# Patient Record
Sex: MALE | Race: WHITE | NOT HISPANIC OR LATINO | Employment: UNEMPLOYED | URBAN - METROPOLITAN AREA
[De-identification: names, ages, dates, MRNs, and addresses within clinical notes are randomized per-mention and may not be internally consistent; named-entity substitution may affect disease eponyms.]

---

## 2018-10-28 ENCOUNTER — COMMUNICATION - HEALTHEAST (OUTPATIENT)
Dept: NEUROSURGERY | Facility: CLINIC | Age: 38
End: 2018-10-28

## 2018-10-29 ENCOUNTER — OFFICE VISIT - HEALTHEAST (OUTPATIENT)
Dept: NEUROSURGERY | Facility: CLINIC | Age: 38
End: 2018-10-29

## 2018-10-29 DIAGNOSIS — M54.16 LUMBAR RADICULOPATHY: ICD-10-CM

## 2021-05-24 ENCOUNTER — RECORDS - HEALTHEAST (OUTPATIENT)
Dept: ADMINISTRATIVE | Facility: CLINIC | Age: 41
End: 2021-05-24

## 2021-05-25 ENCOUNTER — RECORDS - HEALTHEAST (OUTPATIENT)
Dept: ADMINISTRATIVE | Facility: CLINIC | Age: 41
End: 2021-05-25

## 2021-06-16 PROBLEM — M54.16 LUMBAR RADICULOPATHY: Status: ACTIVE | Noted: 2018-10-27

## 2021-06-16 PROBLEM — M54.16 ACUTE LUMBAR RADICULOPATHY: Status: ACTIVE | Noted: 2018-10-28

## 2021-06-21 NOTE — PROGRESS NOTES
CHART NOTE     DATE OF SERVICE:  10/29/2018     : 1980   38 y.o.     ASSESSMENT :   Lumbar radiculopathy in L5 distribution, initially responding well to steroids then exacerbated with ambulating from ED to clinic appt.      PLAN: continue conservative management, patient does not have insurance. I will send him with narcotics, continue steroids. Will try and NINO to see if this helps with pain. Given he did initially have some improvement, hope this can continue with appropriate interventions in place. He will call us if pain gets worse, he will call us with issues.  Gave information for financial . This will allow some time for him to figure out insurance, pcp appt. If pain does not improve in 3-5 days or with NINO, will plan surgical intervention. Patient in agreement with plan, requesting to try NINO prior to surgical intervention.     HPI:  Dustin Trotter is status post hospital consult for lumbar radiculopathy r/t herniated disc. Initially seen in hospital, pain improving with steroids and pain medication at that time. He was discharged with muscle relaxants, nsaids for pain control. He slept well overnight but was awakened by the pain this AM and presented to the ED. Pain is in the right leg, thigh pain resolved, right shin and inside of right foot burning/painful. Back and hip pain bad also. Pain is worse with standing, better with laying with knees up. Better when bending over. Pain meds and muscle relaxants helpful, ibuprofen ineffective. They would not let him drive from the ED to this appt so he walked. This made the pain severe. After some time laying, patient had improvement in pain with just rest. He works in construction. Pain began three days ago, woke up with stiff/numb leg.     TODAY, patient reports severe burning type pain in the RLE, in L5 distribution. No left leg symptoms. No new changes in status, no bowel or bladder issues. Denies fevers, chills. No active s/sx of  infection.      PAST MEDICAL HISTORY, SURGICAL HISTORY, REVIEW OF SYMPTOMS, MEDICATIONS AND ALLERGIES:  Past medical history, surgical history, ROS, medications and allergies reviewed with patient and remain unchanged from previous visit, except as noted in HPI.     PMH reviewed, no contributory, is a smoker.     Past Surgical History:   Procedure Laterality Date     APPENDECTOMY       FACIAL COSMETIC SURGERY         Current Outpatient Prescriptions   Medication Sig Dispense Refill     acetaminophen (TYLENOL) 500 MG tablet Take 500 mg by mouth every 6 (six) hours as needed for pain.       cyclobenzaprine (FLEXERIL) 10 MG tablet Take 0.5 tablets (5 mg total) by mouth 3 (three) times a day as needed for muscle spasms. 6 tablet 0     dexamethasone (DECADRON) 4 MG tablet Take 1 tablet (4 mg total) by mouth every 8 (eight) hours for 3 days. 9 tablet 0     ibuprofen (ADVIL,MOTRIN) 400 MG tablet Take 400 mg by mouth every 6 (six) hours as needed for pain.       No current facility-administered medications for this visit.        PHYSICAL EXAM:    /84  Pulse 72  Resp 18    Neurological exam reveals:  Respirations easy, non-labored.   Skin: W/D/I. No rashes, lesions or breaks in integrity.   Recent and remote memory intact, fund of knowledge wnl.    Alert and oriented x3, speech fluent and appropriate.   Comprehension intact with 2 step crossover command.   Finger nose finger smooth and accurate  Counts by Serial 7s with ease, Spells WORLD forward and in reverse.   PERRL, EOMI, No nystagmus,   Face symmetric, tongue midline, Uvula midline,  palate rises with phonation   Shoulder shrug equal  Arm strength bilateral grasp, biceps, triceps, and deltoids 5/5   Leg strength bilateral dorsiflexion, plantar flexion, and hip flexion 5/5, slightly limited due to pain in RLE with ankle inversion.   Straight leg raise positive on the right  Stiff right leg with ambulation, otherwise not bad.    Reflexes: No pathological reflexes      RADIOGRAPHIC IMAGING:  Films personally reviewed and interpreted.     Reviewed imaging with patient.       CONCLUSION:  1.  Unremarkable cord and cauda equina nerve roots.  2.  At L4-L5, there is a prominent inferiorly directed right paracentral/lateral disc extrusion which effaces the right lateral recess and displaces the traversing right L5 nerve root.  3.  At L5-S1, there is moderate bilateral neural foraminal stenosis.  4.  At L2-L3 and L3-L4, there is left lateral recess stenosis with encroachment upon the descending nerve roots at these levels.  5.  Edema of the L4-5 intervertebral disc and adjacent endplates. This may be degenerative. However, clinical correlation is recommended to exclude the alternative possibility of low-grade infection.

## 2021-07-23 ENCOUNTER — HOSPITAL ENCOUNTER (EMERGENCY)
Facility: HOSPITAL | Age: 41
Discharge: HOME | End: 2021-07-23
Attending: STUDENT IN AN ORGANIZED HEALTH CARE EDUCATION/TRAINING PROGRAM | Admitting: STUDENT IN AN ORGANIZED HEALTH CARE EDUCATION/TRAINING PROGRAM

## 2021-07-23 ENCOUNTER — APPOINTMENT (OUTPATIENT)
Dept: RADIOLOGY | Facility: HOSPITAL | Age: 41
End: 2021-07-23
Attending: STUDENT IN AN ORGANIZED HEALTH CARE EDUCATION/TRAINING PROGRAM

## 2021-07-23 VITALS
OXYGEN SATURATION: 98 % | TEMPERATURE: 98.8 F | HEART RATE: 109 BPM | DIASTOLIC BLOOD PRESSURE: 85 MMHG | SYSTOLIC BLOOD PRESSURE: 131 MMHG | BODY MASS INDEX: 29.99 KG/M2 | RESPIRATION RATE: 16 BRPM | WEIGHT: 215 LBS

## 2021-07-23 DIAGNOSIS — J06.9 VIRAL UPPER RESPIRATORY TRACT INFECTION: ICD-10-CM

## 2021-07-23 LAB — SARS-COV-2 RNA RESP QL NAA+PROBE: NEGATIVE

## 2021-07-23 PROCEDURE — 99284 EMERGENCY DEPT VISIT MOD MDM: CPT | Mod: 25

## 2021-07-23 PROCEDURE — 93005 ELECTROCARDIOGRAM TRACING: CPT | Performed by: STUDENT IN AN ORGANIZED HEALTH CARE EDUCATION/TRAINING PROGRAM

## 2021-07-23 PROCEDURE — 71046 X-RAY EXAM CHEST 2 VIEWS: CPT

## 2021-07-23 PROCEDURE — C9803 HOPD COVID-19 SPEC COLLECT: HCPCS

## 2021-07-23 PROCEDURE — 87635 SARS-COV-2 COVID-19 AMP PRB: CPT | Performed by: STUDENT IN AN ORGANIZED HEALTH CARE EDUCATION/TRAINING PROGRAM

## 2021-07-23 ASSESSMENT — ENCOUNTER SYMPTOMS: COUGH: 1

## 2021-07-24 NOTE — ED PROVIDER NOTES
EMERGENCY DEPARTMENT ENCOUNTER      NAME: Dustin Trotter  AGE: 40 year old male  YOB: 1980  MRN: [id number]  EVALUATION DATE & TIME: 7/23/2021  7:47 PM    PCP: No primary care provider on file.    ED PROVIDER: Bacilio Grey M.D.      Chief Complaint   Patient presents with     Nasal Congestion     chest congestion         FINAL IMPRESSION:  1. Viral upper respiratory tract infection          ED COURSE & MEDICAL DECISION MAKING:    Pertinent Labs & Imaging studies reviewed. (See chart for details)  40 year old male presents to the Emergency Department for evaluation of nasal congestion.  Patient initially was tachycardic in triage however EKG is normal here and he is not tachycardic on my exam.  Chest x-ray shows no pneumonia.  Patient's symptoms are consistent with a simple viral URI.  The patient was concerned that he has had previous facial trauma and that he often gets antibiotics when he has nasal congestion.  Patient does not have any constitutional symptoms or fever symptoms only been present for approximately 7 days.  Did not believe patient had a bacterial sinusitis requiring antibiotics.  We will treat this as a viral URI with over-the-counter decongestions and nasal sprays and have him follow-up with his primary doctor on Monday if symptoms persist.    8:00 PM I met with the patient, obtained history, performed an initial exam, and discussed options and plan for diagnostics and treatment here in the ED. [place] PPE was worn during the entire patient encounter.    At the conclusion of the encounter I discussed the results of all of the tests and the disposition. The questions were answered. The patient or family acknowledged understanding and was agreeable with the care plan.         MEDICATIONS GIVEN IN THE EMERGENCY:  Medications - No data to display    NEW PRESCRIPTIONS STARTED AT TODAY'S ER VISIT  Discharge Medication List as of 7/23/2021  8:39 PM              =================================================================    HPI    Patient information was obtained from: Patient    Use of : N/A       Dustin Trotter is a 40 year old male otherwise healthy who presents to this ED via private car for evaluation of worsening nasal congestion over last ~7 days. He states the congestion has spread to his chest, and he endorses a cough with yellow phlegm that began ~6 days ago. Patient tried taking decongestants and cough syrup with no provided relief. Notably, he mentions that he experiences sinus infections annually. Patient is a regular smoker. Has no history of asthma. He is not COVID-19 vaccinated.     Denies chest pain, or any additional symptoms.    REVIEW OF SYSTEMS   Review of Systems   HENT: Positive for congestion. Negative for sneezing.    Respiratory: Positive for cough.    Cardiovascular: Negative for chest pain.   All other systems reviewed and are negative.      PAST MEDICAL HISTORY:  No past medical history on file.    PAST SURGICAL HISTORY:  Past Surgical History:   Procedure Laterality Date     APPENDECTOMY       FACIAL COSMETIC SURGERY             CURRENT MEDICATIONS:    acetaminophen (TYLENOL) 500 MG tablet  cyclobenzaprine (FLEXERIL) 10 MG tablet  ibuprofen (ADVIL,MOTRIN) 400 MG tablet        ALLERGIES:  No Known Allergies    FAMILY HISTORY:  No family history on file.    SOCIAL HISTORY:   Social History     Socioeconomic History     Marital status: Single     Spouse name: Not on file     Number of children: Not on file     Years of education: Not on file     Highest education level: Not on file   Occupational History     Not on file   Tobacco Use     Smoking status: Current Every Day Smoker     Packs/day: 0.50     Years: 20.00     Pack years: 10.00     Smokeless tobacco: Never Used   Substance and Sexual Activity     Alcohol use: Yes     Comment: Alcoholic Drinks/day: rarely     Drug use: Yes     Types: Marijuana     Sexual activity: Not  on file   Other Topics Concern     Not on file   Social History Narrative    Works in landscaping and [employer]     Social Determinants of Health     Financial Resource Strain:      Difficulty of Paying Living Expenses:    Food Insecurity:      Worried About Running Out of Food in the Last Year:      Ran Out of Food in the Last Year:    Transportation Needs:      Lack of Transportation (Medical):      Lack of Transportation (Non-Medical):    Physical Activity:      Days of Exercise per Week:      Minutes of Exercise per Session:    Stress:      Feeling of Stress :    Social Connections:      Frequency of Communication with Friends and Family:      Frequency of Social Gatherings with Friends and Family:      Attends [employer] Services:      Active Member of Clubs or Organizations:      Attends Club or Organization Meetings:      Marital Status:    Intimate Partner Violence:      Fear of Current or Ex-Partner:      Emotionally Abused:      Physically Abused:      Sexually Abused:        VITALS:  /85   Pulse 109   Temp 98.8  F (37.1  C) (Temporal)   Resp 16   Wt 97.5 kg (215 lb)   SpO2 98%   BMI 29.99 kg/m        PHYSICAL EXAM    Constitutional: Well developed, Well nourished, NAD, GCS 15  HENT: Normocephalic, Atraumatic, Bilateral external ears normal, Oropharynx normal, mucous membranes moist, Nose normal. Neck-  Normal range of motion, No tenderness, Supple, No stridor.  Eyes: PERRL, EOMI, Conjunctiva normal, No discharge.   Respiratory: Mild wheezing with no focality. No respiratory distress, Speaks full sentences easily.   Cardiovascular: Normal heart rate, Regular rhythm, No murmurs, No rubs, No gallops. Chest wall nontender.  GI:Soft, No tenderness, No masses, No flank tenderness. No rebound or guarding.   Musculoskeletal: 2+ DP pulses. No edema.No cyanosis, No clubbing. Good range of motion in all major joints. No tenderness to palpation or major deformities noted.   Integument: Warm, Dry, No  erythema, No rash. No petechiae.   Neurologic: Alert & oriented x 3,  CN 3-12 intact Normal motor function, Normal sensory function, No focal deficits noted. Normal gait. Normal finger to nose bilaterally  Psychiatric: Affect normal, Judgment normal, Mood normal. Cooperative.          LAB:  All pertinent labs reviewed and interpreted.  Results for orders placed or performed during the hospital encounter of 07/23/21   Chest XR,  PA & LAT    Impression    IMPRESSION: Negative chest.   SARS-COV2 (COVID-19) Virus RT-PCR    Specimen: Nasopharyngeal; Swab   Result Value Ref Range    SARS CoV2 PCR Negative Negative       RADIOLOGY:  Reviewed all pertinent imaging. Please see official radiology report.  Chest XR,  PA & LAT   Final Result   IMPRESSION: Negative chest.          I, Aron Mishra, am serving as a scribe to document services personally performed by Dr. Bacilio Grey based on my observation and the provider's statements to me. Bacilio LECHUGA MD attest that Aron Mishra is acting in a scribe capacity, has observed my performance of the services and has documented them in accordance with my direction.    Bacilio Grey M.D.  Emergency Medicine  [facility] EMERGENCY DEPARTMENT  [address] [address]  [phone]  Dept: [phone]     Bacilio Grey MD  07/23/21 2540

## 2021-07-26 LAB
ATRIAL RATE - MUSE: 82 BPM
DIASTOLIC BLOOD PRESSURE - MUSE: NORMAL MMHG
INTERPRETATION ECG - MUSE: NORMAL
P AXIS - MUSE: 60 DEGREES
PR INTERVAL - MUSE: 146 MS
QRS DURATION - MUSE: 96 MS
QT - MUSE: 358 MS
QTC - MUSE: 418 MS
R AXIS - MUSE: 55 DEGREES
SYSTOLIC BLOOD PRESSURE - MUSE: NORMAL MMHG
T AXIS - MUSE: 61 DEGREES
VENTRICULAR RATE- MUSE: 82 BPM

## 2021-12-03 ENCOUNTER — APPOINTMENT (OUTPATIENT)
Dept: RADIOLOGY | Facility: HOSPITAL | Age: 41
End: 2021-12-03
Attending: STUDENT IN AN ORGANIZED HEALTH CARE EDUCATION/TRAINING PROGRAM

## 2021-12-03 ENCOUNTER — APPOINTMENT (OUTPATIENT)
Dept: RADIOLOGY | Facility: HOSPITAL | Age: 41
End: 2021-12-03

## 2021-12-03 ENCOUNTER — HOSPITAL ENCOUNTER (EMERGENCY)
Facility: HOSPITAL | Age: 41
Discharge: HOME | End: 2021-12-03
Admitting: STUDENT IN AN ORGANIZED HEALTH CARE EDUCATION/TRAINING PROGRAM

## 2021-12-03 VITALS
HEIGHT: 72 IN | DIASTOLIC BLOOD PRESSURE: 90 MMHG | TEMPERATURE: 98 F | WEIGHT: 210 LBS | RESPIRATION RATE: 18 BRPM | HEART RATE: 75 BPM | SYSTOLIC BLOOD PRESSURE: 154 MMHG | BODY MASS INDEX: 28.44 KG/M2 | OXYGEN SATURATION: 100 %

## 2021-12-03 DIAGNOSIS — R03.0 ELEVATED BLOOD PRESSURE READING WITHOUT DIAGNOSIS OF HYPERTENSION: ICD-10-CM

## 2021-12-03 DIAGNOSIS — M25.512 ACUTE PAIN OF LEFT SHOULDER: ICD-10-CM

## 2021-12-03 DIAGNOSIS — R07.89 CHEST WALL PAIN: ICD-10-CM

## 2021-12-03 DIAGNOSIS — S63.501A WRIST SPRAIN, RIGHT, INITIAL ENCOUNTER: ICD-10-CM

## 2021-12-03 DIAGNOSIS — S63.502A SPRAIN OF LEFT WRIST, INITIAL ENCOUNTER: ICD-10-CM

## 2021-12-03 PROCEDURE — 73030 X-RAY EXAM OF SHOULDER: CPT | Mod: LT

## 2021-12-03 PROCEDURE — 99285 EMERGENCY DEPT VISIT HI MDM: CPT | Mod: 25

## 2021-12-03 PROCEDURE — 250N000013 HC RX MED GY IP 250 OP 250 PS 637: Performed by: PHYSICIAN ASSISTANT

## 2021-12-03 PROCEDURE — 73110 X-RAY EXAM OF WRIST: CPT | Mod: LT

## 2021-12-03 PROCEDURE — 71046 X-RAY EXAM CHEST 2 VIEWS: CPT

## 2021-12-03 RX ORDER — IBUPROFEN 600 MG/1
600 TABLET, FILM COATED ORAL ONCE
Status: COMPLETED | OUTPATIENT
Start: 2021-12-03 | End: 2021-12-03

## 2021-12-03 RX ADMIN — IBUPROFEN 600 MG: 600 TABLET, FILM COATED ORAL at 21:40

## 2021-12-03 ASSESSMENT — ENCOUNTER SYMPTOMS
WOUND: 0
BACK PAIN: 0
NECK PAIN: 0
ARTHRALGIAS: 1

## 2021-12-03 ASSESSMENT — MIFFLIN-ST. JEOR: SCORE: 1900.55

## 2021-12-04 ASSESSMENT — ENCOUNTER SYMPTOMS: BRUISES/BLEEDS EASILY: 0

## 2021-12-04 NOTE — ED TRIAGE NOTES
Pt was being handcuffed yesterday and states the  were rough. States left wrist pain. States unable to feel any fingers. Positive pulse.

## 2021-12-04 NOTE — ED PROVIDER NOTES
EMERGENCY DEPARTMENT ENCOUNTER      NAME: Dustin Trotter  AGE: 41 year old male  YOB: 1980  MRN: [id number]  EVALUATION DATE & TIME: No admission date for patient encounter.    PCP: No Ref-Primary, Physician    ED PROVIDER: Ellen Thomas PA-C      Chief Complaint   Patient presents with     Wrist Pain         FINAL IMPRESSION:  1. Sprain of left wrist, initial encounter    2. Wrist sprain, right, initial encounter    3. Acute pain of left shoulder    4. Chest wall pain    5. Elevated blood pressure reading without diagnosis of hypertension          MEDICAL DECISION MAKING:    Pertinent Labs & Imaging studies reviewed. (See chart for details)  41 year old male presents to the Emergency Department for evaluation of bilateral wrist pain, left shoulder pain and anterior chest wall pain after being arrested yesterday.     Vitals reviewed and notable for elevated blood pressure. No evidence of head trauma. No scalp tenderness or hematoma. Cervical spine cleared using NEXUS criteria. On exam he has some mild tenderness to left anterior shoulder. No AC tenderness. Full ROM. Mild anterior chest wall tenderness. Tenderness to left clavicle. No crepitus, obvious deformity, or overlying skin changes. Lungs CTAB. No bruising. Tenderness to dorsum of bilateral wrists. Full ROM. Subjective decreased sensation to all 5 fingers on left hand with gross sensation intact. Brisk capillary refill. 2+ and symmetric pulses. No back or spinal tenderness. Differential diagnosis includes but not limited to fracture, dislocation, ligamentous injury, nerve compression/carpal tunnel, chest wall contusion.     Xray left wrist unremarkable with no fracture. Gross sensation intact, great profusion. Discussed possibility of nerve compression from handcuffs. Will place in splint for 1-2 weeks and have him see orthopedics if not improved. Given full ROM of right wrist with minimal tenderness discussed very low likelihood of fracture and  with shared decision making, xray of right wrist was not performed. He is requesting splint of right wrist as well which was provided. Discussed importance of taking splints off and performing gentle ROM exercises a few times per day. Xray of chest unremarkable. No fracture of clavicle or rib fracture identified. Xray left shoulder with no acute fracture. Questionable grade 1 AC separation however he has no tenderness to AC joint on exam so suspect this is normal variant. Sling provided however again discussed importance of gentle ROM exercises. He was given tylenol. We discussed conservative management with tylenol, ibuprofen, rest, ice. Discussed return precautions and close follow up if no improvement. Patient discharged home in stable condition.     0 minutes of critical care time     ED COURSE:  9:28 PM I met with the patient, obtained history, performed an initial exam, and discussed options and plan for diagnostics and treatment here in the ED.  10:05 PM Patient discharged after being provided with extensive anticipatory guidance and given return precautions, importance of PCP follow-up emphasized.    At the conclusion of the encounter I discussed the results of all of the tests and the disposition. The questions were answered. The patient and family acknowledged understanding and were agreeable with the care plan.     MEDICATIONS GIVEN IN THE EMERGENCY:  Medications   ibuprofen (ADVIL/MOTRIN) tablet 600 mg (600 mg Oral Given 12/3/21 2140)       NEW PRESCRIPTIONS STARTED AT TODAY'S ER VISIT  Discharge Medication List as of 12/3/2021 10:48 PM               =================================================================    HPI:    Patient information was obtained from: Patient    Use of Interpretor: N/A      Dustin Trotter is a 41 year old male with no pertinent history who presents to this ED via private vehicle with his mother for evaluation of wrist pain.     Patient reports he was arrested yesterday and  was thrown on the ground and handcuffed. He reports the  were rough with him. He reports pain to bilateral wrists. He notes numbness in all 5 fingers of his left hand. He also reports chest wall pain and pain to left clavicle and shoulder. He denies head injury or loss of consciousness. Took tylenol prior to arrival.     REVIEW OF SYSTEMS:  Review of Systems   Musculoskeletal: Positive for arthralgias (left shoulder, bilateral wrists). Negative for back pain, gait problem and neck pain.        Positive for left clavicle pain and chest wall pain   Skin: Negative for wound.   Neurological: Negative for syncope.   Hematological: Does not bruise/bleed easily.   All other systems reviewed and are negative.      PAST MEDICAL HISTORY:  No past medical history on file.    PAST SURGICAL HISTORY:  Past Surgical History:   Procedure Laterality Date     APPENDECTOMY       FACIAL COSMETIC SURGERY             CURRENT MEDICATIONS:    No current facility-administered medications for this encounter.    Current Outpatient Medications:      acetaminophen (TYLENOL) 500 MG tablet, [ACETAMINOPHEN (TYLENOL) 500 MG TABLET] Take 500 mg by mouth every 6 (six) hours as needed for pain., Disp: , Rfl:      cyclobenzaprine (FLEXERIL) 10 MG tablet, [CYCLOBENZAPRINE (FLEXERIL) 10 MG TABLET] Take 0.5 tablets (5 mg total) by mouth 3 (three) times a day as needed for muscle spasms., Disp: 6 tablet, Rfl: 0     ibuprofen (ADVIL,MOTRIN) 400 MG tablet, [IBUPROFEN (ADVIL,MOTRIN) 400 MG TABLET] Take 400 mg by mouth every 6 (six) hours as needed for pain., Disp: , Rfl:       ALLERGIES:  No Known Allergies    FAMILY HISTORY:  No family history on file.    SOCIAL HISTORY:   Social History     Socioeconomic History     Marital status: Single     Spouse name: Not on file     Number of children: Not on file     Years of education: Not on file     Highest education level: Not on file   Occupational History     Not on file   Tobacco Use     Smoking status:  Current Every Day Smoker     Packs/day: 0.50     Years: 20.00     Pack years: 10.00     Smokeless tobacco: Never Used   Substance and Sexual Activity     Alcohol use: Yes     Comment: Alcoholic Drinks/day: rarely     Drug use: Yes     Types: Marijuana     Sexual activity: Not on file   Other Topics Concern     Not on file   Social History Narrative    Works in landscaping and [employer]     Social Determinants of Health     Financial Resource Strain: Not on file   Food Insecurity: Not on file   Transportation Needs: Not on file   Physical Activity: Not on file   Stress: Not on file   Social Connections: Not on file   Intimate Partner Violence: Not on file   Housing Stability: Not on file       VITALS:  Patient Vitals for the past 24 hrs:   BP Temp Temp src Pulse Resp SpO2 Height Weight   12/03/21 2050 (!) 154/90 98  F (36.7  C) Oral 75 18 100 % 1.829 m (6') 95.3 kg (210 lb)       PHYSICAL EXAM  Constitutional: Well developed, Well nourished, NAD  HENT: Normocephalic, Atraumatic, Bilateral external ears normal, Oropharynx normal, mucous membranes moist, Nose normal.   Neck: Normal range of motion, No midline cervical spine tenderness. No pain with axial loading. Supple, No stridor.  Eyes: PERRL, EOMI, Conjunctiva normal, No discharge.   Respiratory: Normal breath sounds, No respiratory distress, No wheezing, Speaks full sentences easily. No cough.  Cardiovascular: Normal heart rate, Regular rhythm, No murmurs, No rubs, No gallops. Anterior chest wall tenderness along clavicle bilaterally. No crepitus, palpable deformity or overlying skin changes.   GI: Soft, No tenderness, No masses, No flank tenderness. No rebound or guarding.  Musculoskeletal: 2+ DP and radial pulses. No edema. No cyanosis, No clubbing. Good range of motion in all major joints. Tenderness to left mid to distal clavicle. No obvious deformity or skin tenting. Tenderness to anterior left shoulder. Able to perform full ROM. No tenderness to AC joint.  Left/Right wrist: No gross deformities. Appear symmetric. Tenderness to dorsum of wrists bilaterally. No tenderness with palpation along radius, ulna, or throughout carpal bones and hand. No snuffbox tenderness. Tendons appear intact with no tenderness along palpation. Full ROM bilaterally.  strength intact and symmetric. Full ROM of distal fingers. Subjective diminished sensation to all 5 distal fingers on the left with gross sensation intact. Brisk capillary refill bilaterally. He is texting on his phone using both hands without difficulty. No tenderness of the CTLS spine.   Integument: Warm, Dry, No erythema, No rash. No petechiae. No lesions or breaks in the skin.   Neurologic: Alert & oriented x 3, Normal motor function, Normal sensory function, No focal deficits noted. Normal gait.  Psychiatric: Affect normal, Judgment normal, Mood normal. Cooperative.      RADIOLOGY:  Reviewed all pertinent imaging. Please see official radiology report.  XR Shoulder Left 2 Views   Final Result   IMPRESSION: Left shoulder negative for fracture or dislocation. Mild widening of the acromioclavicular joint space (6 mm) nonspecific, could represent grade 1 injury in the appropriate clinical context.       Chest XR,  PA & LAT   Final Result   IMPRESSION: Negative chest.      XR Wrist Left G/E 3 Views   Final Result   IMPRESSION: Normal joint spaces and alignment. No fracture. Small nonspecific ulnar styloid subcortical cyst. Small distal ulnar bone island.          Ellen Thomas PA-C  Emergency Medicine  [facility]  12/3/2021       Ellen Thomas PA-C  12/04/21 0946       Ellen Thomas PA-C  12/04/21 0977

## 2021-12-04 NOTE — DISCHARGE INSTRUCTIONS
AC separation on left side. Wear sling for comfort. Wrist splints for comfort. No signs of any fractures. Tylenol and ibuprofen as needed for pain. Follow up with orthopedics within 1 week.

## 2024-04-02 ENCOUNTER — APPOINTMENT (OUTPATIENT)
Dept: RADIOLOGY | Facility: HOSPITAL | Age: 44
End: 2024-04-02
Payer: MEDICAID

## 2024-04-02 ENCOUNTER — APPOINTMENT (OUTPATIENT)
Dept: CT IMAGING | Facility: HOSPITAL | Age: 44
End: 2024-04-02
Payer: MEDICAID

## 2024-04-02 ENCOUNTER — HOSPITAL ENCOUNTER (EMERGENCY)
Facility: HOSPITAL | Age: 44
Discharge: HOME | End: 2024-04-02
Payer: MEDICAID

## 2024-04-02 VITALS
TEMPERATURE: 97.9 F | BODY MASS INDEX: 29.16 KG/M2 | HEIGHT: 73 IN | WEIGHT: 220 LBS | OXYGEN SATURATION: 97 % | SYSTOLIC BLOOD PRESSURE: 160 MMHG | DIASTOLIC BLOOD PRESSURE: 90 MMHG | HEART RATE: 75 BPM | RESPIRATION RATE: 16 BRPM

## 2024-04-02 DIAGNOSIS — M54.50 ACUTE BILATERAL LOW BACK PAIN WITHOUT SCIATICA: ICD-10-CM
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
## 2024-04-02 PROBLEM — F19.11 HISTORY OF SUBSTANCE ABUSE (H): Status: ACTIVE | Noted: 2024-04-02

## 2024-04-02 PROBLEM — F19.11 HISTORY OF SUBSTANCE ABUSE (H): Chronic | Status: ACTIVE | Noted: 2024-04-02

## 2024-04-02 PROCEDURE — 72128 CT CHEST SPINE W/O DYE: CPT

## 2024-04-02 PROCEDURE — 99285 EMERGENCY DEPT VISIT HI MDM: CPT | Mod: 25

## 2024-04-02 PROCEDURE — 73080 X-RAY EXAM OF ELBOW: CPT | Mod: LT

## 2024-04-02 PROCEDURE — 72131 CT LUMBAR SPINE W/O DYE: CPT

## 2024-04-02 PROCEDURE — 250N000013 HC RX MED GY IP 250 OP 250 PS 637

## 2024-04-02 PROCEDURE — 71101 X-RAY EXAM UNILAT RIBS/CHEST: CPT | Mod: LT

## 2024-04-02 RX ORDER — OXYCODONE HYDROCHLORIDE 5 MG/1
TABLET ORAL
Qty: 10 TABLET | Refills: 0 | Status: SHIPPED | OUTPATIENT
Start: 2024-04-02

## 2024-04-02 RX ORDER — OXYCODONE AND ACETAMINOPHEN 5; 325 MG/1; MG/1
1 TABLET ORAL ONCE
Status: COMPLETED | OUTPATIENT
Start: 2024-04-02 | End: 2024-04-02

## 2024-04-02 RX ADMIN — OXYCODONE HYDROCHLORIDE AND ACETAMINOPHEN 1 TABLET: 5; 325 TABLET ORAL at 08:31

## 2024-04-02 ASSESSMENT — COLUMBIA-SUICIDE SEVERITY RATING SCALE - C-SSRS
6. HAVE YOU EVER DONE ANYTHING, STARTED TO DO ANYTHING, OR PREPARED TO DO ANYTHING TO END YOUR LIFE?: NO
2. HAVE YOU ACTUALLY HAD ANY THOUGHTS OF KILLING YOURSELF IN THE PAST MONTH?: NO
1. IN THE PAST MONTH, HAVE YOU WISHED YOU WERE DEAD OR WISHED YOU COULD GO TO SLEEP AND NOT WAKE UP?: NO

## 2024-04-02 ASSESSMENT — ACTIVITIES OF DAILY LIVING (ADL): ADLS_ACUITY_SCORE: 35

## 2024-04-02 ASSESSMENT — VISUAL ACUITY: OU: 1
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
## 2024-04-02 NOTE — DISCHARGE INSTRUCTIONS
Your physical exam and imaging was reassuring, and although the exact cause of your pain was not found today, it does not appear to be due to a cause requiring emergent intervention. I anticipate your pain is from soft tissue injury sustained in your fall.    Please continue to take your scheduled acetaminophen and ibuprofen at home as well as the prescribed lidocaine patches and gabapentin you were given previously for your back pain. I have prescribed you a limited supply of oxycodone, a strong narcotic pain reliever.  Please use this for severe breakthrough pain that is not otherwise controlled by acetaminophen and ibuprofen.  This medication can cause drowsiness so do not drive while taking.    Most back pain resolves on its own with conservative management within 4 to 6 weeks.  If this pain persists please reach out to the [facility] (contact information provided).      ACTIVITY RECOMMENDATIONS:  Rest for the first 24-48 hours. Slowly start to increase your activity level as you are able to. If something causes your pain to come back or get worse, stop and go back to doing easier activities that did not hurt. Try to stay as active as you can without causing too much pain.   Avoid sitting or standing in one position for a long time.     Please refer to the attachments provided for more information on how to care for yourself at home.    FOLLOW UP  Please schedule an appointment with any primary care provider within 5 days for emergency department follow up.    Call your doctor now or seek immediate medical care if:    You have new or worsening numbness in your legs.     You have new or worsening weakness in your legs. (This could make it hard to stand up.)     You lose control of your bladder or bowels.   Watch closely for changes in your health, and be sure to contact your doctor if:    You have a fever, lose weight, or don't feel well.     You do not get better as  expected.

## 2024-04-02 NOTE — ED NOTES
Vi from [facility] called for pt update and to send a ride for pt to return. AVS printed and given to pt. Rx sent to pharmacy. Pt alert and oriented x4. Able to get self into wheelchair, states he is using a wheelchair and crutches to ambulate at facility.